# Patient Record
Sex: FEMALE
[De-identification: names, ages, dates, MRNs, and addresses within clinical notes are randomized per-mention and may not be internally consistent; named-entity substitution may affect disease eponyms.]

---

## 2022-07-05 ENCOUNTER — HOSPITAL ENCOUNTER (OUTPATIENT)
Dept: HOSPITAL 5 - LABHHL | Age: 57
Discharge: HOME | End: 2022-07-05
Attending: INTERNAL MEDICINE
Payer: COMMERCIAL

## 2022-07-05 DIAGNOSIS — N92.5: Primary | ICD-10-CM

## 2022-07-05 LAB
ALBUMIN SERPL-MCNC: 4.9 G/DL (ref 3.9–5)
ALT SERPL-CCNC: 12 UNITS/L (ref 7–56)
BUN SERPL-MCNC: 13 MG/DL (ref 7–17)
BUN/CREAT SERPL: 16 %
CALCIUM SERPL-MCNC: 9.3 MG/DL (ref 8.4–10.2)
HEMOLYSIS INDEX: 6

## 2022-07-05 PROCEDURE — 84146 ASSAY OF PROLACTIN: CPT

## 2022-07-05 PROCEDURE — 36415 COLL VENOUS BLD VENIPUNCTURE: CPT

## 2022-07-05 PROCEDURE — 80053 COMPREHEN METABOLIC PANEL: CPT

## 2022-07-05 PROCEDURE — 84443 ASSAY THYROID STIM HORMONE: CPT

## 2022-07-05 PROCEDURE — 82670 ASSAY OF TOTAL ESTRADIOL: CPT

## 2022-07-05 PROCEDURE — 83001 ASSAY OF GONADOTROPIN (FSH): CPT

## 2022-07-06 ENCOUNTER — HOSPITAL ENCOUNTER (OUTPATIENT)
Dept: HOSPITAL 5 - CT | Age: 57
Discharge: HOME | End: 2022-07-06
Attending: INTERNAL MEDICINE
Payer: COMMERCIAL

## 2022-07-06 DIAGNOSIS — K76.89: ICD-10-CM

## 2022-07-06 DIAGNOSIS — C50.012: Primary | ICD-10-CM

## 2022-07-06 DIAGNOSIS — N83.202: ICD-10-CM

## 2022-07-06 DIAGNOSIS — R59.0: ICD-10-CM

## 2022-07-06 PROCEDURE — 74177 CT ABD & PELVIS W/CONTRAST: CPT

## 2022-07-06 PROCEDURE — 71260 CT THORAX DX C+: CPT

## 2022-07-06 NOTE — CAT SCAN REPORT
CT CHEST, ABDOMEN, AND PELVIS WITH CONTRAST



INDICATION / CLINICAL INFORMATION: C50.012. Left breast mass



TECHNIQUE: Axial CT images were obtained through the chest, abdomen, and pelvis after IV contrast. Al
l CT scans at this location are performed using CT dose reduction for ALARA by means of automated exp
osure control. 



COMPARISON: None available.



FINDINGS:

HEART: No significant abnormality.

CORONARY ARTERY CALCIFICATION: Absent -- None.

THORACIC AORTA: No significant abnormality.  

MEDIASTINUM / MALENA: No significant abnormality.

PLEURA: No pleural effusion. No pneumothorax.

LUNGS: No acute air space or interstitial disease.

ADDITIONAL CHEST FINDINGS: Mild left axillary adenopathy with largest node measuring 11 mm short axis
. Solid 2.4 cm mass within the central retroareolar portion of left breast



LIVER: Multiple simple hepatic cysts.

GALLBLADDER: No significant abnormality.  

BILE DUCTS: No significant abnormality.

PANCREAS: No significant abnormality.

SPLEEN: No significant abnormality.

ADRENALS: No significant abnormality.

RIGHT KIDNEY / URETER: No significant abnormality.

LEFT KIDNEY / URETER: No significant abnormality.



STOMACH and SMALL BOWEL: No significant abnormality. 

COLON: No significant abnormality. 

APPENDIX: Normal  

PERITONEUM: No free fluid. No free air. No fluid collection.

LYMPH NODES: No significant adenopathy.

AORTA / ARTERIES: No significant abnormality. 

IVC / VEINS: No significant abnormality.



URINARY BLADDER: No significant abnormality.

REPRODUCTIVE ORGANS: 2 left ovarian cysts measuring 4.1 and 3.2 cm. Bilateral tubal ligation clips.



ADDITIONAL FINDINGS: None.



SKELETAL SYSTEM: No significant abnormality.



IMPRESSION:

1. 2.4 cm central left breast mass with mild left axillary adenopathy.

2. 2 left ovarian cysts measuring up to 4.1 cm

Cysts >3 and 5 cm: Should be described in the imaging report with a statement that they are almost ce
rtainly benign; do not need follow-up.





Signer Name: Alek Damon MD 

Signed: 7/6/2022 10:16 AM

Workstation Name: [email or web address]

## 2022-07-06 NOTE — CAT SCAN REPORT
CT CHEST, ABDOMEN, AND PELVIS WITH CONTRAST



INDICATION / CLINICAL INFORMATION: C50.012. Left breast mass



TECHNIQUE: Axial CT images were obtained through the chest, abdomen, and pelvis after IV contrast. Al
l CT scans at this location are performed using CT dose reduction for ALARA by means of automated exp
osure control. 



COMPARISON: None available.



FINDINGS:

HEART: No significant abnormality.

CORONARY ARTERY CALCIFICATION: Absent -- None.

THORACIC AORTA: No significant abnormality.  

MEDIASTINUM / MALENA: No significant abnormality.

PLEURA: No pleural effusion. No pneumothorax.

LUNGS: No acute air space or interstitial disease.

ADDITIONAL CHEST FINDINGS: Mild left axillary adenopathy with largest node measuring 11 mm short axis
. Solid 2.4 cm mass within the central retroareolar portion of left breast



LIVER: Multiple simple hepatic cysts.

GALLBLADDER: No significant abnormality.  

BILE DUCTS: No significant abnormality.

PANCREAS: No significant abnormality.

SPLEEN: No significant abnormality.

ADRENALS: No significant abnormality.

RIGHT KIDNEY / URETER: No significant abnormality.

LEFT KIDNEY / URETER: No significant abnormality.



STOMACH and SMALL BOWEL: No significant abnormality. 

COLON: No significant abnormality. 

APPENDIX: Normal  

PERITONEUM: No free fluid. No free air. No fluid collection.

LYMPH NODES: No significant adenopathy.

AORTA / ARTERIES: No significant abnormality. 

IVC / VEINS: No significant abnormality.



URINARY BLADDER: No significant abnormality.

REPRODUCTIVE ORGANS: 2 left ovarian cysts measuring 4.1 and 3.2 cm. Bilateral tubal ligation clips.



ADDITIONAL FINDINGS: None.



SKELETAL SYSTEM: No significant abnormality.



IMPRESSION:

1. 2.4 cm central left breast mass with mild left axillary adenopathy.

2. 2 left ovarian cysts measuring up to 4.1 cm

Cysts >3 and 5 cm: Should be described in the imaging report with a statement that they are almost ce
rtainly benign; do not need follow-up.





Signer Name: Alek Damon MD 

Signed: 7/6/2022 10:16 AM

Workstation Name: [email or web address]

## 2022-07-12 ENCOUNTER — HOSPITAL ENCOUNTER (OUTPATIENT)
Dept: HOSPITAL 5 - SPVIMAG | Age: 57
Discharge: HOME | End: 2022-07-12
Attending: SURGERY
Payer: COMMERCIAL

## 2022-07-12 DIAGNOSIS — K76.89: ICD-10-CM

## 2022-07-12 DIAGNOSIS — R59.0: ICD-10-CM

## 2022-07-12 DIAGNOSIS — Z80.41: ICD-10-CM

## 2022-07-12 DIAGNOSIS — C50.512: Primary | ICD-10-CM

## 2022-07-12 DIAGNOSIS — R92.8: ICD-10-CM

## 2022-07-12 PROCEDURE — 77049 MRI BREAST C-+ W/CAD BI: CPT

## 2022-07-12 PROCEDURE — A9575 INJ GADOTERATE MEGLUMI 0.1ML: HCPCS

## 2022-07-12 PROCEDURE — C8908 MRI W/O FOL W/CONT, BREAST,: HCPCS

## 2022-07-12 NOTE — MAGNETIC RESONANCE REPORT
MRI BREAST BILATERAL WITH AND WITHOUT CONTRAST, 7/12/2022



CLINICAL INFORMATION / INDICATION: Patient presents for evaluation of extent of disease of recent bio
psy proven left breast cancer. 



TECHNIQUE: Axial T1 and T2-weighted fat sat images were obtained precontrast. Gadolinium-based contra
st was injected intravenously and serial axial T1 weighted images with fat saturation were obtained. 
3-D MIP projections, kinetic analysis, and subtraction imaging were utilized to evaluate. A dedicated
 8-channel breast coil was used for image acquisition.



COMPARISON: Prior mammogram and left breast ultrasound 6/23/2022



FINDINGS:



BREAST DENSITY: The breasts are heterogeneously dense, which may obscure small masses.



BACKGROUND ENHANCEMENT: Moderate background enhancement within both breasts.



RIGHT BREAST: No dominant mass or suspicious area of enhancement in the right breast.



LEFT BREAST: Corresponding with the site of biopsy proven malignancy in the anterior subareolar left 
breast, there is an irregular enhancing mass just deep to the nipple with associated nipple retractio
n, measuring up to approximately 2.5 x 2.4 x 3.7 cm. The mass is located 4.7 cm from the chest wall. 
No additional suspicious areas of enhancement identified in the left breast.



AXILLAE: There are several enlarged left axillary lymph nodes with diffuse cortical thickening measur
ing up to approximately 5 mm in thickness, worrisome for judith metastases.



ADDITIONAL FINDINGS: Hepatic cysts are noted. Otherwise, limited imaging of the thorax and upper abdo
men demonstrates no focal abnormality.

 



IMPRESSION: 

1. An irregular enhancing mass in the anterior subareolar left breast corresponds with the site of bi
opsy proven malignancy.

2. Several enlarged left axillary lymph nodes are worrisome for judith metastases.

3. No suspicious MRI abnormality identified in the right breast.



Follow up recommendation: Surgical consultation with Dr. Hanson is underway.



BI-RADS Category 6:  KNOWN BIOPSY-PROVEN MALIGNANCY.



Signer Name: Carole Brooke MD 

Signed: 7/12/2022 2:25 PM

Workstation Name: [email or web address]

## 2022-07-14 ENCOUNTER — HOSPITAL ENCOUNTER (OUTPATIENT)
Dept: HOSPITAL 5 - US | Age: 57
Discharge: HOME | End: 2022-07-14
Attending: SURGERY
Payer: COMMERCIAL

## 2022-07-14 DIAGNOSIS — Z91.013: ICD-10-CM

## 2022-07-14 DIAGNOSIS — C77.3: Primary | ICD-10-CM

## 2022-07-14 DIAGNOSIS — Z88.8: ICD-10-CM

## 2022-07-14 DIAGNOSIS — C50.912: ICD-10-CM

## 2022-07-14 DIAGNOSIS — Z17.0: ICD-10-CM

## 2022-07-14 PROCEDURE — 38505 NEEDLE BIOPSY LYMPH NODES: CPT

## 2022-07-14 PROCEDURE — 76942 ECHO GUIDE FOR BIOPSY: CPT

## 2022-07-14 PROCEDURE — 88305 TISSUE EXAM BY PATHOLOGIST: CPT

## 2022-07-14 PROCEDURE — 88368 INSITU HYBRIDIZATION MANUAL: CPT

## 2022-07-14 PROCEDURE — A4648 IMPLANTABLE TISSUE MARKER: HCPCS

## 2022-07-14 PROCEDURE — 88342 IMHCHEM/IMCYTCHM 1ST ANTB: CPT

## 2022-07-14 NOTE — ULTRASOUND REPORT
ULTRASOUND BIOPSY LYMPH NODE



HISTORY: Left breast cancer. Suspicious left axillary lymph node on recent CT.



COMPARISON: CT dated 6/6/2022.



DESCRIPTION OF PROCEDURE: Informed consent was obtained. Sterile technique was utilized. Anesthetizat
ion was accomplished with 1% lidocaine. Initial scan of the left axilla demonstrates a mildly promine
nt left axillary lymph node measuring up to 1.4 x 0.9 cm on ultrasound. There is mild cortical thicke
ct of the lymph node measuring up to 4.3 mm. This appears to represent the slightly enlarged lymph 
node seen on recent CT. Using ultrasound guidance, a 17-gauge introducer needle was advanced to the l
eading edge of the lymph node. 3 separate 1.2 cm 18-gauge core biopsies were obtained and placed in f
ormalin. A clip was also placed under ultrasound guidance. The patient tolerated procedure without di
fficulty and left radiology in stable condition.



IMPRESSION: Successful ultrasound-guided biopsy of the left axillary lymph node.



Signer Name: Carlos Rios Jr, MD 

Signed: 7/14/2022 11:07 AM

Workstation Name: [id number]

## 2022-07-21 ENCOUNTER — HOSPITAL ENCOUNTER (OUTPATIENT)
Dept: HOSPITAL 5 - PET | Age: 57
Discharge: HOME | End: 2022-07-21
Attending: INTERNAL MEDICINE
Payer: COMMERCIAL

## 2022-07-21 DIAGNOSIS — N63.20: ICD-10-CM

## 2022-07-21 DIAGNOSIS — C50.012: Primary | ICD-10-CM

## 2022-07-21 PROCEDURE — 78815 PET IMAGE W/CT SKULL-THIGH: CPT

## 2022-07-21 PROCEDURE — 82962 GLUCOSE BLOOD TEST: CPT

## 2022-07-21 PROCEDURE — A9552 F18 FDG: HCPCS

## 2022-07-21 NOTE — PET REPORT
PET CT



Indication: Breast cancer initial staging



Technique: A total of 15.05 mCi F-18 FDG injected IV per protocol at approximately 8:10 AM on the day
 of exam with the scan time of 9:10 AM on the same day. CT was utilized for dose attenuation and edmundo
omic localization. The scan occurred from the skull base to the upper thighs.







Comparison: CT chest abdomen pelvis 7/6/2022



Findings: There is focal hypermetabolic uptake identified within the mid left breast, SUV max measuri
ng 13.2. There are several mildly hypermetabolic nodes identified within the left axilla, SUV max candelario
suring 3.1 concerning for local spread. No abnormal radiotracer uptake is identified within the head/
neck, chest, abdomen or pelvis. There is expected GI and  excretion of radiotracer. No abnormal oss
eous uptake is appreciated.



IMPRESSION: Focal hypermetabolic mass within the left mid breast consistent with known primary breast
 cancer. There are also several suspicious hypermetabolic nodes within the left axilla concerning for
 judith metastatic spread.



Signer Name: Zachariah Landin MD 

Signed: 7/21/2022 12:07 PM

Workstation Name: [id number]

## 2022-07-25 ENCOUNTER — HOSPITAL ENCOUNTER (OUTPATIENT)
Dept: HOSPITAL 5 - ECHO | Age: 57
Discharge: HOME | End: 2022-07-25
Attending: INTERNAL MEDICINE
Payer: COMMERCIAL

## 2022-07-25 DIAGNOSIS — D70.9: ICD-10-CM

## 2022-07-25 DIAGNOSIS — C50.112: ICD-10-CM

## 2022-07-25 DIAGNOSIS — Z51.11: Primary | ICD-10-CM

## 2022-07-25 PROCEDURE — 93306 TTE W/DOPPLER COMPLETE: CPT

## 2022-07-25 PROCEDURE — C8929 TTE W OR WO FOL WCON,DOPPLER: HCPCS
